# Patient Record
Sex: FEMALE | Race: BLACK OR AFRICAN AMERICAN | NOT HISPANIC OR LATINO | Employment: STUDENT | URBAN - METROPOLITAN AREA
[De-identification: names, ages, dates, MRNs, and addresses within clinical notes are randomized per-mention and may not be internally consistent; named-entity substitution may affect disease eponyms.]

---
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
## 2025-03-17 ENCOUNTER — HOSPITAL ENCOUNTER (EMERGENCY)
Facility: HOSPITAL | Age: 20
Discharge: HOME | End: 2025-03-17
Attending: EMERGENCY MEDICINE | Admitting: EMERGENCY MEDICINE
Payer: MEDICARE

## 2025-03-17 VITALS
RESPIRATION RATE: 20 BRPM | SYSTOLIC BLOOD PRESSURE: 133 MMHG | OXYGEN SATURATION: 100 % | WEIGHT: 133.82 LBS | TEMPERATURE: 97.7 F | HEART RATE: 82 BPM | DIASTOLIC BLOOD PRESSURE: 76 MMHG | BODY MASS INDEX: 20.35 KG/M2

## 2025-03-17 DIAGNOSIS — R68.89 FLU-LIKE SYMPTOMS: Primary | ICD-10-CM

## 2025-03-17 LAB
FLUAV AG UPPER RESP QL IA.RAPID: NEGATIVE
FLUBV AG UPPER RESP QL IA.RAPID: NEGATIVE
SARS-COV+SARS-COV-2 AG RESP QL IA.RAPID: NEGATIVE

## 2025-03-17 PROCEDURE — 87811 SARS-COV-2 COVID19 W/OPTIC: CPT

## 2025-03-17 PROCEDURE — 87804 INFLUENZA ASSAY W/OPTIC: CPT

## 2025-03-17 PROCEDURE — 99283 EMERGENCY DEPT VISIT LOW MDM: CPT

## 2025-03-17 PROCEDURE — 99284 EMERGENCY DEPT VISIT MOD MDM: CPT

## 2025-03-17 NOTE — ED PROVIDER NOTES
HPI: Patient is a 19 y.o. female who presents with 1 days of fever, chills, cough, headache, sore throat, fatigue, and myalgias which the patient describes at moderate The patient has had contact with people with similar symptoms.  The patient has not taken any medication.    Allergies   Allergen Reactions    Peach Flavor - Food Allergy Other (See Comments)     other    Prunus Persica Hives, Itching and Swelling    Tomato - Food Allergy Itching       Past Medical History:   Diagnosis Date    Asthma       Past Surgical History:   Procedure Laterality Date    VAGINAL DELIVERY       Social History     Tobacco Use    Smoking status: Never     Passive exposure: Never    Smokeless tobacco: Never   Vaping Use    Vaping status: Never Used   Substance Use Topics    Alcohol use: Never    Drug use: Not Currently     Types: Marijuana       Nursing notes reviewed  Physical Exam:  ED Triage Vitals [03/17/25 1316]   Temperature Pulse Respirations Blood Pressure SpO2   97.7 °F (36.5 °C) 82 20 133/76 100 %      Temp Source Heart Rate Source Patient Position - Orthostatic VS BP Location FiO2 (%)   Oral Monitor Sitting Left arm --      Pain Score       --           ROS: Positive for fever, chills, cough, headache, sore throat, fatigue, and myalgias, the remainder of a 10 organ system ROS was otherwise unremarkable.  General: awake, alert, no acute distress    Head: normocephalic, atraumatic    Eyes: no scleral icterus  Ears: external ears normal, hearing grossly intact  Nose: external exam grossly normal, negative nasal discharge  Neck: symmetric, No JVD noted, trachea midline  Pulmonary: no respiratory distress, no tachypnea noted  Cardiovascular: appears well perfused  Abdomen: no distention noted  Musculoskeletal: no deformities noted, tone normal  Neuro: grossly non-focal  Psych: mood and affect appropriate    The patient is stable and has a history and physical exam consistent with a viral illness. COVID19 testing has been  performed.  I considered the patient's other medical conditions as applicable/noted above in my medical decision making.  The patient is stable upon discharge. The plan is for supportive care at home.    The patient (and any family present) verbalized understanding of the discharge instructions and warnings that would necessitate return to the Emergency Department.  All questions were answered prior to discharge.    Medications - No data to display  Final diagnoses:   Flu-like symptoms     Time reflects when diagnosis was documented in both MDM as applicable and the Disposition within this note       Time User Action Codes Description Comment    3/17/2025  2:21 PM Randall Salomon Add [R68.89] Flu-like symptoms           ED Disposition       ED Disposition   Discharge    Condition   Stable    Date/Time   Mon Mar 17, 2025  2:21 PM    Comment   Serenity Hernández discharge to home/self care.                   Follow-up Information       Follow up With Specialties Details Why Contact Info Additional Information    [facility] Emergency Department Emergency Medicine  As needed, If symptoms worsen [address] [facility] [id number] [phone] [facility] Emergency Department          Discharge Medication List as of 3/17/2025  2:21 PM        CONTINUE these medications which have NOT CHANGED    Details   ascorbic acid (VITAMIN C) 250 MG tablet Take 250 mg by mouth, Starting Mon 4/15/2024, Until Tue 4/15/2025 at 2359, Historical Med      ferrous sulfate 324 (65 Fe) mg Take 1 tablet (324 mg total) by mouth 2 (two) times a day before meals, Starting Mon 2024, Normal      ondansetron (ZOFRAN) 4 mg tablet Take 1 tablet (4 mg total) by mouth every 6 (six) hours, Starting Sun 2024, Normal      Prenatal Vit-Fe Fumarate-FA (M-Daron Plus) 27-1 MG TABS TAKE 1 TABLET BY MOUTH DAILY INDICATIONS  PREGNANCY., Historical Med           No discharge procedures on  file.    Electronically Signed by       Randall Salomon PA-C  03/17/25 7681

## 2025-03-17 NOTE — Clinical Note
Serenity Hernández was seen and treated in our emergency department on 3/17/2025.    No restrictions            Diagnosis:     Serenity  .    She may return on this date: 03/20/2025         If you have any questions or concerns, please don't hesitate to call.      Randall Salomon PA-C    ______________________________           _______________          _______________  Hospital Representative                              Date                                Time

## 2025-04-27 ENCOUNTER — HOSPITAL ENCOUNTER (EMERGENCY)
Facility: HOSPITAL | Age: 20
Discharge: HOME | End: 2025-04-27
Attending: EMERGENCY MEDICINE
Payer: MEDICARE

## 2025-04-27 VITALS
SYSTOLIC BLOOD PRESSURE: 128 MMHG | DIASTOLIC BLOOD PRESSURE: 63 MMHG | OXYGEN SATURATION: 98 % | BODY MASS INDEX: 20.18 KG/M2 | TEMPERATURE: 98.2 F | HEART RATE: 90 BPM | RESPIRATION RATE: 18 BRPM | WEIGHT: 132.72 LBS

## 2025-04-27 DIAGNOSIS — Z32.02 NEGATIVE PREGNANCY TEST: ICD-10-CM

## 2025-04-27 DIAGNOSIS — R68.89 FLU-LIKE SYMPTOMS: Primary | ICD-10-CM

## 2025-04-27 LAB
EXT PREGNANCY TEST URINE: NEGATIVE
EXT. CONTROL: NORMAL
FLUAV AG UPPER RESP QL IA.RAPID: NEGATIVE
FLUBV AG UPPER RESP QL IA.RAPID: NEGATIVE
SARS-COV+SARS-COV-2 AG RESP QL IA.RAPID: NEGATIVE

## 2025-04-27 PROCEDURE — 81025 URINE PREGNANCY TEST: CPT

## 2025-04-27 PROCEDURE — 87811 SARS-COV-2 COVID19 W/OPTIC: CPT

## 2025-04-27 PROCEDURE — 99283 EMERGENCY DEPT VISIT LOW MDM: CPT

## 2025-04-27 PROCEDURE — 87804 INFLUENZA ASSAY W/OPTIC: CPT

## 2025-04-27 RX ORDER — DEXTROMETHORPHAN HYDROBROMIDE AND PROMETHAZINE HYDROCHLORIDE 15; 6.25 MG/5ML; MG/5ML
5 SYRUP ORAL 4 TIMES DAILY PRN
Qty: 118 ML | Refills: 0 | Status: SHIPPED | OUTPATIENT
Start: 2025-04-27

## 2025-04-27 NOTE — ED PROVIDER NOTES
"Time reflects when diagnosis was documented in both MDM as applicable and the Disposition within this note       Time User Action Codes Description Comment    4/27/2025  4:06 PM Rebeca Clarke Add [R68.89] Flu-like symptoms     4/27/2025  4:14 PM Rebeca Clarke Add [Z32.02] Negative pregnancy test           ED Disposition       ED Disposition   Discharge    Condition   Stable    Date/Time   Sun Apr 27, 2025  4:14 PM    Comment   Serenity Hernández discharge to home/self care.                   Assessment & Plan       Medical Decision Making  Differential diagnosis to include but not limited to: flu, covid, other viral etiology, pregnancy  Negative covid, flu testing. Suspect viral etiology based on time onset. Discussed supportive care.  Negative pregnancy test.    Pt stable at time of discharge, vital signs reviewed, questions answered. Strict ER return precautions provided/discussed and were well understood by patient. Patient's vitals, labs and/or imaging results, diagnosis, and treatment plan were discussed with the patient. All new and/or changed medications were discussed - specifically to include route of administration, how often to take, when to take, and the pharmacy they were sent to. Strict return precautions as well as close follow up with PCP was discussed with the patient and the patient was agreeable to my recommendations.  Patient verbally acknowledged understanding. All labs, imaging were reviewed and used in the medical decision making process (if ordered).     Portions of this chart may have been written with voice recognition software.  Occasional grammatical errors, wrong word or \"sound a like\" substitutions may have occurred due to software limitations.  Please read carefully and use context to recognize where substitutions have occurred.    Problems Addressed:  Flu-like symptoms: acute illness or injury  Negative pregnancy test: acute illness or injury    Amount and/or Complexity of Data " "Reviewed  Labs: ordered. Decision-making details documented in ED Course.    Risk  Prescription drug management.        ED Course as of 04/27/25 1617   Sun Apr 27, 2025   1602 SARS COV Rapid Antigen: Negative   1602 Influenza A Rapid Antigen: Negative   1602 Influenza B Rapid Antigen: Negative       Medications - No data to display    ED Risk Strat Scores              CRAFFT      Flowsheet Row Most Recent Value   CRAFFT Initial Screen: During the past 12 months, did you:    1. Drink any alcohol (more than a few sips)?  No Filed at: 04/27/2025 1527   2. Smoke any marijuana or hashish No Filed at: 04/27/2025 1523   3. Use anything else to get high? (\"anything else\" includes illegal drugs, over the counter and prescription drugs, and things that you sniff or 'bates')? No Filed at: 04/27/2025 6083              No data recorded                            History of Present Illness       Chief Complaint   Patient presents with    Cold Like Symptoms       Past Medical History:   Diagnosis Date    Asthma       Past Surgical History:   Procedure Laterality Date    VAGINAL DELIVERY        No family history on file.   Social History     Tobacco Use    Smoking status: Never     Passive exposure: Never    Smokeless tobacco: Never   Vaping Use    Vaping status: Never Used   Substance Use Topics    Alcohol use: Never    Drug use: Not Currently     Types: Marijuana      E-Cigarette/Vaping    E-Cigarette Use Never User       E-Cigarette/Vaping Substances    Nicotine No     THC No     CBD No     Flavoring No     Other No     Unknown No       I have reviewed and agree with the history as documented.     Serenity is a 19 year old female presenting to the ED for headache, congestion, rhinorrhea, sore throat, dry cough, myalgias x this morning. No known sick contacts but recent travel from [place]. No medications taken. Is also requesting a pregnancy test, hasn't had a normal period in a month.         Review of Systems   Constitutional:  " Negative for chills and fever.   HENT:  Positive for congestion, rhinorrhea and sore throat.    Eyes:  Negative for discharge, redness and itching.   Respiratory:  Positive for cough. Negative for shortness of breath.    Cardiovascular:  Negative for chest pain and palpitations.   Gastrointestinal:  Positive for nausea. Negative for abdominal pain, diarrhea and vomiting.   Genitourinary:  Positive for menstrual problem.   Musculoskeletal:  Positive for myalgias.   Neurological:  Positive for headaches. Negative for light-headedness.           Objective       ED Triage Vitals [04/27/25 1523]   Temperature Pulse Blood Pressure Respirations SpO2 Patient Position - Orthostatic VS   98.2 °F (36.8 °C) 90 128/63 18 98 % Sitting      Temp Source Heart Rate Source BP Location FiO2 (%) Pain Score    Oral Monitor Left arm -- --      Vitals      Date and Time Temp Pulse SpO2 Resp BP Pain Score FACES Pain Rating User   04/27/25 1523 98.2 °F (36.8 °C) 90 98 % 18 128/63 -- -- AS            Physical Exam  Vitals reviewed.   Constitutional:       General: She is not in acute distress.     Appearance: Normal appearance. She is not ill-appearing or toxic-appearing.   HENT:      Head: Normocephalic and atraumatic.      Right Ear: External ear normal.      Left Ear: External ear normal.      Nose: Congestion and rhinorrhea present.      Mouth/Throat:      Mouth: Mucous membranes are moist.      Pharynx: Oropharynx is clear. Posterior oropharyngeal erythema present. No pharyngeal swelling or oropharyngeal exudate.      Tonsils: No tonsillar exudate or tonsillar abscesses.   Eyes:      Extraocular Movements: Extraocular movements intact.      Conjunctiva/sclera: Conjunctivae normal.   Cardiovascular:      Rate and Rhythm: Normal rate and regular rhythm.      Pulses: Normal pulses.   Pulmonary:      Effort: Pulmonary effort is normal. No respiratory distress.      Breath sounds: Normal breath sounds.   Musculoskeletal:         General:  Normal range of motion.      Cervical back: Normal range of motion and neck supple. No rigidity or tenderness.   Lymphadenopathy:      Cervical: Cervical adenopathy present.   Skin:     General: Skin is warm and dry.      Capillary Refill: Capillary refill takes less than 2 seconds.   Neurological:      General: No focal deficit present.      Mental Status: She is alert.   Psychiatric:         Mood and Affect: Mood normal.         Behavior: Behavior normal.         Results Reviewed       Procedure Component Value Units Date/Time    POCT pregnancy, urine [[id number]]  (Normal) Collected: 04/27/25 1600    Lab Status: Final result Updated: 04/27/25 1614     EXT Preg Test, Ur Negative     Control Valid    FLU/COVID Rapid Antigen (30 min. TAT) - Preferred screening test in ED [[id number]]  (Normal) Collected: 04/27/25 1539    Lab Status: Final result Specimen: Nares from Nose Updated: 04/27/25 1602     SARS COV Rapid Antigen Negative     Influenza A Rapid Antigen Negative     Influenza B Rapid Antigen Negative    Narrative:      This test has been performed using the Quidel Racquel 2 FLU+SARS Antigen test under the Emergency Use Authorization (EUA). This test has been validated by the  and verified by the performing laboratory. The Racquel uses lateral flow immunofluorescent sandwich assay to detect SARS-COV, Influenza A and Influenza B Antigen.     The Quidel Racquel 2 SARS Antigen test does not differentiate between SARS-CoV and SARS-CoV-2.     Negative results are presumptive and may be confirmed with a molecular assay, if necessary, for patient management. Negative results do not rule out SARS-CoV-2 or influenza infection and should not be used as the sole basis for treatment or patient management decisions. A negative test result may occur if the level of antigen in a sample is below the limit of detection of this test.     Positive results are indicative of the presence of viral antigens, but do not rule out  bacterial infection or co-infection with other viruses.     All test results should be used as an adjunct to clinical observations and other information available to the provider.    FOR PEDIATRIC PATIENTS - copy/paste COVID Guidelines URL to browser: [email or web address]            No orders to display       Procedures    ED Medication and Procedure Management   Prior to Admission Medications   Prescriptions Last Dose Informant Patient Reported? Taking?   Prenatal Vit-Fe Fumarate-FA (M-Daron Plus) 27-1 MG TABS   Yes No   Sig: TAKE 1 TABLET BY MOUTH DAILY INDICATIONS  PREGNANCY.   ascorbic acid (VITAMIN C) 250 MG tablet   Yes No   Sig: Take 250 mg by mouth   ferrous sulfate 324 (65 Fe) mg   No No   Sig: Take 1 tablet (324 mg total) by mouth 2 (two) times a day before meals   ondansetron (ZOFRAN) 4 mg tablet   No No   Sig: Take 1 tablet (4 mg total) by mouth every 6 (six) hours      Facility-Administered Medications: None     Patient's Medications   Discharge Prescriptions    PROMETHAZINE-DEXTROMETHORPHAN (PHENERGAN-DM) 6.25-15 MG/5 ML ORAL SYRUP    Take 5 mL by mouth 4 (four) times a day as needed for cough       Start Date: 2025 End Date: --       Order Dose: 5 mL       Quantity: 118 mL    Refills: 0     No discharge procedures on file.  ED SEPSIS DOCUMENTATION   Time reflects when diagnosis was documented in both MDM as applicable and the Disposition within this note       Time User Action Codes Description Comment    2025  4:06 PM Rebeca Clarke [R68.89] Flu-like symptoms     2025  4:14 PM Rebeca Clarke [Z32.02] Negative pregnancy test                  Rebeca Clarke PA-C  [phone]

## 2025-04-27 NOTE — Clinical Note
Serenity Hernández was seen and treated in our emergency department on 4/27/2025.                Diagnosis:     Serenity  may return to school on return date.    She may return on this date: 04/30/2025    May return sooner if symptoms improve.     If you have any questions or concerns, please don't hesitate to call.      Rebeca Clarke PA-C    ______________________________           _______________          _______________  Hospital Representative                              Date                                Time

## 2025-04-27 NOTE — DISCHARGE INSTRUCTIONS
"For congestion: Nasacort nasal sprays in the morning/night, aim away from the middle of the nose. Can bridge the doses with saline spray. NetiPot with distilled warmed water can provide further relief as well.   For cough: Medication prescribed to the pharmacy on file. Take as needed. Can also take over the counter cold medications. \"Expectorant\" means it may make you cough more. \"Suppressant\" will relieve.  For sore throat: Sips of water or Pedialyte every 15 minutes to ensure hydration without inducing nausea. Teaspoon of honey. Salt water gargles. Ice pops.  For further supportive care: Rotate Tylenol and Motrin every 3 hours for best relief. For example, take Motrin then in 3 hours take Tylenol then 3 hours Motrin, repeat. Maximum Tylenol daily: 4,000 mg. Maximum ibuprofen daily: 3,600 mg.  Sleep with a humidifier, consider applying Vicks Vapor Rub to the chest.  "

## 2025-05-17 ENCOUNTER — HOSPITAL ENCOUNTER (EMERGENCY)
Facility: HOSPITAL | Age: 20
Discharge: HOME | End: 2025-05-17
Attending: EMERGENCY MEDICINE
Payer: MEDICARE

## 2025-05-17 VITALS
SYSTOLIC BLOOD PRESSURE: 134 MMHG | OXYGEN SATURATION: 97 % | HEART RATE: 80 BPM | TEMPERATURE: 98.2 F | BODY MASS INDEX: 20.15 KG/M2 | WEIGHT: 132.5 LBS | DIASTOLIC BLOOD PRESSURE: 72 MMHG | RESPIRATION RATE: 22 BRPM

## 2025-05-17 DIAGNOSIS — R19.7 COMBINED ABDOMINAL PAIN, VOMITING, AND DIARRHEA: Primary | ICD-10-CM

## 2025-05-17 DIAGNOSIS — R10.9 COMBINED ABDOMINAL PAIN, VOMITING, AND DIARRHEA: Primary | ICD-10-CM

## 2025-05-17 DIAGNOSIS — R11.10 COMBINED ABDOMINAL PAIN, VOMITING, AND DIARRHEA: Primary | ICD-10-CM

## 2025-05-17 LAB
EXT PREGNANCY TEST URINE: NEGATIVE
EXT. CONTROL: NORMAL

## 2025-05-17 PROCEDURE — 81025 URINE PREGNANCY TEST: CPT

## 2025-05-17 PROCEDURE — 99284 EMERGENCY DEPT VISIT MOD MDM: CPT

## 2025-05-17 RX ORDER — ONDANSETRON 4 MG/1
4 TABLET, ORALLY DISINTEGRATING ORAL EVERY 6 HOURS PRN
Qty: 8 TABLET | Refills: 0 | Status: SHIPPED | OUTPATIENT
Start: 2025-05-17

## 2025-05-17 RX ORDER — ONDANSETRON 4 MG/1
4 TABLET, ORALLY DISINTEGRATING ORAL ONCE
Status: COMPLETED | OUTPATIENT
Start: 2025-05-17 | End: 2025-05-17

## 2025-05-17 RX ADMIN — ONDANSETRON 4 MG: 4 TABLET, ORALLY DISINTEGRATING ORAL at 16:19

## 2025-05-17 RX ADMIN — Medication 524 MG: at 16:19

## 2025-05-17 NOTE — ED PROVIDER NOTES
"Time reflects when diagnosis was documented in both MDM as applicable and the Disposition within this note       Time User Action Codes Description Comment    5/17/2025  4:44 PM Bridgette Aparicio Add [R10.9,  R19.7,  R11.10] Combined abdominal pain, vomiting, and diarrhea           ED Disposition       ED Disposition   Discharge    Condition   Stable    Date/Time   Sat May 17, 2025  4:44 PM    Comment   Serenity Hernández discharge to home/self care.                   Assessment & Plan       Medical Decision Making  Differential diagnosis includes but is not limited to gastroenteritis, obstruction, appendicitis, cholecystitis, ectopic pregnancy.  Negative urine hCG.  No focal tenderness.  Low clinical suspicion for emergent intra-abdominal pathology at this time.  Diarrhea, vomiting. Afebrile. Stable vitals. No blood or mucus/pus in bowel movements. Clinically well-hydrated. Benign, reassuring abdominal exam without tenderness, distention or peritoneal signs.  Gastroenteritis suspected. At this time, no overt indication for antibiotic. Antiemetic provided. Patient improved and is tolerating po for continued hydration at home. Will discharge patient with anti-emetics/anti-spasmodics as needed and follow up with primary care provider.     All imaging and/or lab testing discussed with patient, strict return to ED precautions discussed. Patient recommended to follow up promptly with appropriate outpatient provider and risk of morbidity/mortality if patient does not follow up as recommended was discussed. Patient and/or family members verbalizes understanding and agrees with plan. Patient and/or family members were given opportunity to ask questions, all questions were answered at this time. Patient is stable for discharge.     Portions of the record may have been created with voice recognition software. Occasional wrong word or \"sound a like\" substitutions may have occurred due to the inherent limitations of voice recognition " "software. Read the chart carefully and recognize, using context, where substitutions have occurred.       Amount and/or Complexity of Data Reviewed  Labs: ordered.    Risk  OTC drugs.  Prescription drug management.        ED Course as of 05/18/25 1232   Sat May 17, 2025   1601 Nausea vomiting diarrhea since this morning.  No other symptoms.  No focal tenderness.  Generalized cramping.  Able to tolerate Tylenol p.o.  Denies blood or mucus in stool.  Denies fever.   1641 Reports improvement of symptoms.  Was tolerating p.o. prior to arrival.  Hydration encouraged.       Medications   ondansetron (ZOFRAN-ODT) dispersible tablet 4 mg (4 mg Oral Given 5/17/25 1619)   bismuth subsalicylate (PEPTO BISMOL) oral suspension 524 mg (524 mg Oral Given 5/17/25 1619)       ED Risk Strat Scores              CRAFFT      Flowsheet Row Most Recent Value   CRAFFT Initial Screen: During the past 12 months, did you:    1. Drink any alcohol (more than a few sips)?  No Filed at: 05/17/2025 1531   2. Smoke any marijuana or hashish No Filed at: 05/17/2025 1531   3. Use anything else to get high? (\"anything else\" includes illegal drugs, over the counter and prescription drugs, and things that you sniff or 'bates')? No Filed at: 05/17/2025 1531              No data recorded                            History of Present Illness       Chief Complaint   Patient presents with    Abdominal Pain     Pt c/o lower abdominal cramping with V/D that started this morning, states she took tylenol around 1pm with no pain relief.        Past Medical History:   Diagnosis Date    Asthma       Past Surgical History:   Procedure Laterality Date    VAGINAL DELIVERY        History reviewed. No pertinent family history.   Social History[1]   E-Cigarette/Vaping    E-Cigarette Use Current Some Day User       E-Cigarette/Vaping Substances    Nicotine No     THC No     CBD No     Flavoring Yes     Other No     Unknown No       I have reviewed and agree with the history " as documented.     19-year-old female past medical history of asthma presenting to emergency room complaining of nausea, vomiting, diarrhea, generalized abdominal cramping since this morning.  Multiple episodes of both.  Denies fevers.  Denies blood or mucus in stool.  Took Tylenol.      History provided by:  Patient  Vomiting  Associated symptoms: abdominal pain and diarrhea    Associated symptoms: no chills, no cough, no fever, no headaches, no myalgias, no sore throat and no URI    Diarrhea:     Quality:  Watery  Risk factors: no alcohol use, no diabetes, not pregnant, no sick contacts and no travel to endemic areas        Review of Systems   Constitutional:  Negative for chills and fever.   HENT:  Negative for sore throat.    Respiratory:  Negative for cough.    Gastrointestinal:  Positive for abdominal pain, diarrhea, nausea and vomiting. Negative for abdominal distention and blood in stool.   Genitourinary:  Negative for decreased urine volume and dysuria.   Musculoskeletal:  Negative for back pain and myalgias.   Skin:  Negative for color change and rash.   Neurological:  Negative for syncope and headaches.   All other systems reviewed and are negative.          Objective       ED Triage Vitals [05/17/25 1548]   Temperature Pulse Blood Pressure Respirations SpO2 Patient Position - Orthostatic VS   98.2 °F (36.8 °C) 80 134/72 22 97 % Lying      Temp src Heart Rate Source BP Location FiO2 (%) Pain Score    -- Monitor Left arm -- --      Vitals      Date and Time Temp Pulse SpO2 Resp BP Pain Score FACES Pain Rating User   05/17/25 1548 98.2 °F (36.8 °C) 80 97 % 22 134/72 -- -- KR            Physical Exam  Vitals and nursing note reviewed.   Constitutional:       General: She is awake. She is not in acute distress.     Appearance: Normal appearance. She is not ill-appearing, toxic-appearing or diaphoretic.   HENT:      Head: Normocephalic.      Mouth/Throat:      Lips: Pink.      Mouth: Mucous membranes are  moist.     Eyes:      General: Vision grossly intact. No scleral icterus.      Cardiovascular:      Rate and Rhythm: Normal rate and regular rhythm.      Heart sounds: Normal heart sounds.   Pulmonary:      Effort: Pulmonary effort is normal. No respiratory distress.      Breath sounds: Normal breath sounds.   Abdominal:      General: There is no distension.      Palpations: Abdomen is soft.      Tenderness: There is no abdominal tenderness. There is no guarding.     Skin:     General: Skin is warm and dry.      Coloration: Skin is not jaundiced.     Neurological:      Mental Status: She is alert.      Gait: Gait normal.         Results Reviewed       Procedure Component Value Units Date/Time    POCT pregnancy, urine [[id number]]  (Normal) Collected: 25    Lab Status: Final result Updated: 25     EXT Preg Test, Ur Negative     Control Valid            No orders to display       Procedures    ED Medication and Procedure Management   Prior to Admission Medications   Prescriptions Last Dose Informant Patient Reported? Taking?   Prenatal Vit-Fe Fumarate-FA (M- Plus) 27-1 MG TABS   Yes No   Sig: TAKE 1 TABLET BY MOUTH DAILY INDICATIONS  PREGNANCY.   ascorbic acid (VITAMIN C) 250 MG tablet   Yes No   Sig: Take 250 mg by mouth   ferrous sulfate 324 (65 Fe) mg   No No   Sig: Take 1 tablet (324 mg total) by mouth 2 (two) times a day before meals   ondansetron (ZOFRAN) 4 mg tablet   No No   Sig: Take 1 tablet (4 mg total) by mouth every 6 (six) hours   promethazine-dextromethorphan (PHENERGAN-DM) 6.25-15 mg/5 mL oral syrup   No No   Sig: Take 5 mL by mouth 4 (four) times a day as needed for cough      Facility-Administered Medications: None     Discharge Medication List as of 2025  4:46 PM        START taking these medications    Details   bismuth subsalicylate (PEPTO BISMOL) 524 mg/30 mL oral suspension Take 15 mL (262 mg total) by mouth every 6 (six) hours as needed for indigestion or  diarrhea, Starting Sat 2025, Normal      ondansetron (ZOFRAN-ODT) 4 mg disintegrating tablet Take 1 tablet (4 mg total) by mouth every 6 (six) hours as needed for vomiting or nausea for up to 8 doses, Starting Sat 2025, Normal           CONTINUE these medications which have NOT CHANGED    Details   ascorbic acid (VITAMIN C) 250 MG tablet Take 250 mg by mouth, Starting Mon 4/15/2024, Until Tue 4/15/2025 at 2359, Historical Med      ferrous sulfate 324 (65 Fe) mg Take 1 tablet (324 mg total) by mouth 2 (two) times a day before meals, Starting Mon 2024, Normal      Prenatal Vit-Fe Fumarate-FA (M-Daron Plus) 27-1 MG TABS TAKE 1 TABLET BY MOUTH DAILY INDICATIONS  PREGNANCY., Historical Med      promethazine-dextromethorphan (PHENERGAN-DM) 6.25-15 mg/5 mL oral syrup Take 5 mL by mouth 4 (four) times a day as needed for cough, Starting Sun 2025, Normal           STOP taking these medications       ondansetron (ZOFRAN) 4 mg tablet Comments:   Reason for Stopping:             No discharge procedures on file.  ED SEPSIS DOCUMENTATION   Time reflects when diagnosis was documented in both MDM as applicable and the Disposition within this note       Time User Action Codes Description Comment    2025  4:44 PM Bridgette Aparicio Add [R10.9,  R19.7,  R11.10] Combined abdominal pain, vomiting, and diarrhea                      [1]   Social History  Tobacco Use    Smoking status: Never     Passive exposure: Never    Smokeless tobacco: Never   Vaping Use    Vaping status: Some Days    Substances: Flavoring   Substance Use Topics    Alcohol use: Never    Drug use: Not Currently     Types: Marijuana        Bridgette Aparicio PA-C  [phone]